# Patient Record
Sex: FEMALE | Race: WHITE
[De-identification: names, ages, dates, MRNs, and addresses within clinical notes are randomized per-mention and may not be internally consistent; named-entity substitution may affect disease eponyms.]

---

## 2018-05-28 ENCOUNTER — HOSPITAL ENCOUNTER (EMERGENCY)
Dept: HOSPITAL 61 - ER | Age: 58
Discharge: HOME | End: 2018-05-28
Payer: COMMERCIAL

## 2018-05-28 DIAGNOSIS — L23.9: Primary | ICD-10-CM

## 2018-05-28 DIAGNOSIS — E03.9: ICD-10-CM

## 2018-05-28 DIAGNOSIS — Z88.0: ICD-10-CM

## 2018-05-28 DIAGNOSIS — I10: ICD-10-CM

## 2018-05-28 DIAGNOSIS — Z88.1: ICD-10-CM

## 2018-05-28 PROCEDURE — 96372 THER/PROPH/DIAG INJ SC/IM: CPT

## 2018-05-28 PROCEDURE — 99283 EMERGENCY DEPT VISIT LOW MDM: CPT

## 2018-05-28 RX ADMIN — DEXAMETHASONE SODIUM PHOSPHATE 1 MG: 4 INJECTION, SOLUTION INTRAMUSCULAR; INTRAVENOUS at 09:56

## 2018-07-10 ENCOUNTER — HOSPITAL ENCOUNTER (EMERGENCY)
Dept: HOSPITAL 61 - ER | Age: 58
Discharge: HOME | End: 2018-07-10
Payer: COMMERCIAL

## 2018-07-10 DIAGNOSIS — Z88.1: ICD-10-CM

## 2018-07-10 DIAGNOSIS — F10.10: ICD-10-CM

## 2018-07-10 DIAGNOSIS — F41.9: ICD-10-CM

## 2018-07-10 DIAGNOSIS — E03.9: ICD-10-CM

## 2018-07-10 DIAGNOSIS — E86.0: ICD-10-CM

## 2018-07-10 DIAGNOSIS — Z88.0: ICD-10-CM

## 2018-07-10 DIAGNOSIS — I10: ICD-10-CM

## 2018-07-10 DIAGNOSIS — I47.1: Primary | ICD-10-CM

## 2018-07-10 DIAGNOSIS — Z90.710: ICD-10-CM

## 2018-07-10 LAB
ADD MAN DIFF?: NO
ALBUMIN SERPL-MCNC: 3.9 G/DL (ref 3.4–5)
ALBUMIN/GLOB SERPL: 1 {RATIO} (ref 1–1.7)
ALP SERPL-CCNC: 62 U/L (ref 46–116)
ALT (SGPT): 37 U/L (ref 14–59)
ANION GAP SERPL CALC-SCNC: 11 MMOL/L (ref 6–14)
AST SERPL-CCNC: 51 U/L (ref 15–37)
BASO #: 0 X10^3/UL (ref 0–0.2)
BASO %: 1 % (ref 0–3)
BLOOD UREA NITROGEN: 22 MG/DL (ref 7–20)
BUN/CREAT SERPL: 18 (ref 6–20)
CALCIUM: 9.6 MG/DL (ref 8.5–10.1)
CHLORIDE: 102 MMOL/L (ref 98–107)
CHOLESTEROL/HDL RATIO: 1.9
CHOLESTEROL: 278 MG/DL (ref 0–200)
CK SERPL-CCNC: 59 U/L (ref 26–192)
CKMB INDEX: (no result) % (ref 0–4)
CKMB MASS: 0.7 NG/ML (ref 0–3.6)
CO2 SERPL-SCNC: 24 MMOL/L (ref 21–32)
CREAT SERPL-MCNC: 1.2 MG/DL (ref 0.6–1)
EOS #: 0.1 X10^3/UL (ref 0–0.7)
EOS %: 2 % (ref 0–3)
GFR SERPLBLD BASED ON 1.73 SQ M-ARVRAT: 45.8 ML/MIN
GLOBULIN SER-MCNC: 3.8 G/DL (ref 2.2–3.8)
GLUCOSE SERPL-MCNC: 105 MG/DL (ref 70–99)
HCG SERPL-ACNC: 4 X10^3/UL (ref 4–11)
HDLC: 146 MG/DL (ref 40–60)
HEMATOCRIT: 37.2 % (ref 36–47)
HEMOGLOBIN: 12.9 G/DL (ref 12–15.5)
INR: 0.9 (ref 0.8–1.1)
LDLC: 119 MG/DL (ref 0–100)
LYMPH #: 1 X10^3/UL (ref 1–4.8)
LYMPH %: 24 % (ref 24–48)
MAGNESIUM: 1.8 MG/DL (ref 1.8–2.4)
MEAN CORPUSCULAR HEMOGLOBIN: 33 PG (ref 25–35)
MEAN CORPUSCULAR HGB CONC: 35 G/DL (ref 31–37)
MEAN CORPUSCULAR VOLUME: 96 FL (ref 79–100)
MONO #: 0.3 X10^3/UL (ref 0–1.1)
MONO %: 7 % (ref 0–9)
NEUT #: 2.6 X10^3UL (ref 1.8–7.7)
NEUT %: 67 % (ref 31–73)
NON-HDL CHOLESTEROL: 132 MG/DL (ref 0–129)
NT-PRO BNP: 31 PG/ML (ref 0–124)
PLATELET COUNT: 219 X10^3/UL (ref 140–400)
POTASSIUM SERPL-SCNC: 4.6 MMOL/L (ref 3.5–5.1)
PROTHROMBIN TIME PATIENT: 11.7 SEC (ref 11.7–14)
RED BLOOD COUNT: 3.89 X10^6/UL (ref 3.5–5.4)
RED CELL DISTRIBUTION WIDTH: 13.7 % (ref 11.5–14.5)
SODIUM: 137 MMOL/L (ref 136–145)
THYROID STIM HORMONE (TSH): 3.67 UIU/ML (ref 0.36–3.74)
TOTAL BILIRUBIN: 0.5 MG/DL (ref 0.2–1)
TOTAL PROTEIN: 7.7 G/DL (ref 6.4–8.2)
TRIGLYCERIDES: 63 MG/DL (ref 0–150)
VLDLC: 13 MG/DL (ref 0–40)

## 2018-07-10 PROCEDURE — 83735 ASSAY OF MAGNESIUM: CPT

## 2018-07-10 PROCEDURE — 83880 ASSAY OF NATRIURETIC PEPTIDE: CPT

## 2018-07-10 PROCEDURE — 85025 COMPLETE CBC W/AUTO DIFF WBC: CPT

## 2018-07-10 PROCEDURE — 80053 COMPREHEN METABOLIC PANEL: CPT

## 2018-07-10 PROCEDURE — 99285 EMERGENCY DEPT VISIT HI MDM: CPT

## 2018-07-10 PROCEDURE — 93005 ELECTROCARDIOGRAM TRACING: CPT

## 2018-07-10 PROCEDURE — 80061 LIPID PANEL: CPT

## 2018-07-10 PROCEDURE — 85610 PROTHROMBIN TIME: CPT

## 2018-07-10 PROCEDURE — 82553 CREATINE MB FRACTION: CPT

## 2018-07-10 PROCEDURE — 36415 COLL VENOUS BLD VENIPUNCTURE: CPT

## 2018-07-10 PROCEDURE — 71045 X-RAY EXAM CHEST 1 VIEW: CPT

## 2018-07-10 PROCEDURE — 84443 ASSAY THYROID STIM HORMONE: CPT

## 2021-08-07 ENCOUNTER — HOSPITAL ENCOUNTER (EMERGENCY)
Dept: HOSPITAL 61 - ER | Age: 61
Discharge: HOME | End: 2021-08-07
Payer: COMMERCIAL

## 2021-08-07 VITALS — DIASTOLIC BLOOD PRESSURE: 55 MMHG | SYSTOLIC BLOOD PRESSURE: 98 MMHG

## 2021-08-07 VITALS — BODY MASS INDEX: 28.41 KG/M2 | HEIGHT: 70 IN | WEIGHT: 198.42 LBS

## 2021-08-07 DIAGNOSIS — Z88.0: ICD-10-CM

## 2021-08-07 DIAGNOSIS — Z90.710: ICD-10-CM
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
## 2021-08-07 DIAGNOSIS — Z88.1: ICD-10-CM

## 2021-08-07 DIAGNOSIS — E86.0: ICD-10-CM

## 2021-08-07 DIAGNOSIS — F10.20: ICD-10-CM

## 2021-08-07 DIAGNOSIS — E03.9: ICD-10-CM

## 2021-08-07 DIAGNOSIS — I10: ICD-10-CM

## 2021-08-07 DIAGNOSIS — R55: Primary | ICD-10-CM

## 2021-08-07 LAB
ALBUMIN SERPL-MCNC: 3.6 G/DL (ref 3.4–5)
ALBUMIN/GLOB SERPL: 1.2 {RATIO} (ref 1–1.7)
ALP SERPL-CCNC: 58 U/L (ref 46–116)
ALT SERPL-CCNC: 37 U/L (ref 14–59)
ANION GAP SERPL CALC-SCNC: 9 MMOL/L (ref 6–14)
AST SERPL-CCNC: 31 U/L (ref 15–37)
BASOPHILS # BLD AUTO: 0.1 X10^3/UL (ref 0–0.2)
BASOPHILS NFR BLD: 1 % (ref 0–3)
BILIRUB SERPL-MCNC: 0.2 MG/DL (ref 0.2–1)
BUN SERPL-MCNC: 29 MG/DL (ref 7–20)
BUN/CREAT SERPL: 26 (ref 6–20)
CALCIUM SERPL-MCNC: 9.3 MG/DL (ref 8.5–10.1)
CHLORIDE SERPL-SCNC: 99 MMOL/L (ref 98–107)
CO2 SERPL-SCNC: 26 MMOL/L (ref 21–32)
CREAT SERPL-MCNC: 1.1 MG/DL (ref 0.6–1)
EOSINOPHIL NFR BLD: 0.1 X10^3/UL (ref 0–0.7)
EOSINOPHIL NFR BLD: 1 % (ref 0–3)
ERYTHROCYTE [DISTWIDTH] IN BLOOD BY AUTOMATED COUNT: 13.1 % (ref 11.5–14.5)
GFR SERPLBLD BASED ON 1.73 SQ M-ARVRAT: 50.2 ML/MIN
GLUCOSE SERPL-MCNC: 110 MG/DL (ref 70–99)
HCT VFR BLD CALC: 32.1 % (ref 36–47)
HGB BLD-MCNC: 11.2 G/DL (ref 12–15.5)
LYMPHOCYTES # BLD: 1.4 X10^3/UL (ref 1–4.8)
LYMPHOCYTES NFR BLD AUTO: 19 % (ref 24–48)
MAGNESIUM SERPL-MCNC: 2 MG/DL (ref 1.8–2.4)
MCH RBC QN AUTO: 33 PG (ref 25–35)
MCHC RBC AUTO-ENTMCNC: 35 G/DL (ref 31–37)
MCV RBC AUTO: 95 FL (ref 79–100)
MONO #: 0.6 X10^3/UL (ref 0–1.1)
MONOCYTES NFR BLD: 7 % (ref 0–9)
NEUT #: 5.5 X10^3/UL (ref 1.8–7.7)
NEUTROPHILS NFR BLD AUTO: 72 % (ref 31–73)
PLATELET # BLD AUTO: 180 X10^3/UL (ref 140–400)
POTASSIUM SERPL-SCNC: 4.9 MMOL/L (ref 3.5–5.1)
PROT SERPL-MCNC: 6.5 G/DL (ref 6.4–8.2)
RBC # BLD AUTO: 3.36 X10^6/UL (ref 3.5–5.4)
SODIUM SERPL-SCNC: 134 MMOL/L (ref 136–145)
WBC # BLD AUTO: 7.6 X10^3/UL (ref 4–11)

## 2021-08-07 PROCEDURE — 93005 ELECTROCARDIOGRAM TRACING: CPT

## 2021-08-07 PROCEDURE — 96361 HYDRATE IV INFUSION ADD-ON: CPT

## 2021-08-07 PROCEDURE — 85025 COMPLETE CBC W/AUTO DIFF WBC: CPT

## 2021-08-07 PROCEDURE — 83735 ASSAY OF MAGNESIUM: CPT

## 2021-08-07 PROCEDURE — 99283 EMERGENCY DEPT VISIT LOW MDM: CPT

## 2021-08-07 PROCEDURE — 84484 ASSAY OF TROPONIN QUANT: CPT

## 2021-08-07 PROCEDURE — 36415 COLL VENOUS BLD VENIPUNCTURE: CPT

## 2021-08-07 PROCEDURE — 80053 COMPREHEN METABOLIC PANEL: CPT

## 2021-08-07 PROCEDURE — 96360 HYDRATION IV INFUSION INIT: CPT

## 2021-08-07 PROCEDURE — 83880 ASSAY OF NATRIURETIC PEPTIDE: CPT

## 2021-08-07 NOTE — PHYS DOC
Past Medical History


Past Medical History:  Anxiety, Hypertension, Hypothyroid


Additional Past Medical Histor:  SVT


Past Surgical History:  Hysterectomy, Other


Additional Past Surgical Histo:  Node biopsy on neck, cardiac ablation


Smoking Status:  Never Smoker


Alcohol Use:  Heavy


Drug Use:  None





General Adult


EDM:


Chief Complaint:  NEAR SYNCOPE





HPI:


HPI:





Patient is a 61  year old female who was brought here by EMS due to near 

syncopal episode.  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 

was walking outside under the heat, she started sweating feeling dizziness, feel

like she might pass out.  Patient sat down in the chair, recheck her blood 

pressure was low, EMS was called, they check her blood pressure was 73/40, they 

gave her some normal saline.  Patient did not eat breakfast this morning, they 

gave her a donut to eat.  Patient has a history of hypertension, she is on 

lisinopril 20 mg daily.  Patient did take her blood pressure medications the 

morning.  Patient denies any cough or fever, no chest pain, no abdominal pain, 

no headache.  Patient denies any nausea vomiting.  Patient was vaccinated for 

COVID-19 in April.  Patient states She is feeling much better now.





Review of Systems:


Review of Systems:


Constitutional:   Denies fever or chills. []


Eyes:   Denies change in visual acuity. []


HENT:   Denies nasal congestion or sore throat. [] 


Respiratory:   Denies cough or shortness of breath. [] 


Cardiovascular:   Denies chest pain or edema. [] 


GI:   Denies abdominal pain, nausea, vomiting, bloody stools or diarrhea. [] 


:  Denies dysuria. [] 


Musculoskeletal:   Denies back pain or joint pain. [] 


Integument:   Denies rash. [] 


Neurologic:   Denies headache, focal weakness or sensory changes.  Positive for 

near syncope.


Endocrine:   Denies polyuria or polydipsia. [] 


Lymphatic:  Denies swollen glands. [] 


Psychiatric:  Denies depression or anxiety. []





Heart Score:


C/O Chest Pain:  N/A


Risk Factors:


Risk Factors:  DM, Current or recent (<one month) smoker, HTN, HLP, family 

history of CAD, obesity.


Risk Scores:


Score 0 - 3:  2.5% MACE over next 6 weeks - Discharge Home


Score 4 - 6:  20.3% MACE over next 6 weeks - Admit for Clinical Observation


Score 7 - 10:  72.7% MACE over next 6 weeks - Early Invasive Strategies





Current Medications:





Current Medications








 Medications


  (Trade)  Dose


 Ordered  Sig/Linda  Start Time


 Stop Time Status Last Admin


Dose Admin


 


 Sodium Chloride  1,000 ml @ 


 1,000 mls/hr  1X  ONCE  8/7/21 09:00


 8/7/21 09:59   














Allergies:


Allergies:





Allergies








Coded Allergies Type Severity Reaction Last Updated Verified


 


  Penicillins Allergy Intermediate swelling 5/28/18 Yes


 


  amoxicillin Allergy Intermediate swelling 5/28/18 Yes











Physical Exam:


PE:





Constitutional: Well developed, well nourished, no acute distress, non-toxic 

appearance. []


HENT: Normocephalic, atraumatic, bilateral external ears normal, oropharynx 

moist, no oral exudates, nose normal. []


Eyes: PERRLA, EOMI, conjunctiva normal, no discharge. [] 


Neck: Normal range of motion, no tenderness, supple, no stridor. [] 


Cardiovascular:Heart rate regular rhythm, no murmur []


Lungs & Thorax:  Bilateral breath sounds clear to auscultation []


Abdomen: Bowel sounds normal, soft, no tenderness, no masses, no pulsatile 

masses. [] 


Skin: Warm, dry, no erythema, no rash. [] 


Back: No tenderness, no CVA tenderness. [] 


Extremities: No tenderness, no cyanosis, no clubbing, ROM intact, no edema. [] 


Neurologic: Alert and oriented X 3, normal motor function, normal sensory 

function, no focal deficits noted. []


Psychologic: Affect normal, judgement normal, mood normal. []





Current Patient Data:


Labs:





Laboratory Tests








Test


 8/7/21


08:36


 


White Blood Count 7.6 x10^3/uL 


 


Red Blood Count 3.36 x10^6/uL 


 


Hemoglobin 11.2 g/dL 


 


Hematocrit 32.1 % 


 


Mean Corpuscular Volume 95 fL 


 


Mean Corpuscular Hemoglobin 33 pg 


 


Mean Corpuscular Hemoglobin


Concent 35 g/dL 





 


Red Cell Distribution Width 13.1 % 


 


Platelet Count 180 x10^3/uL 


 


Neutrophils (%) (Auto) 72 % 


 


Lymphocytes (%) (Auto) 19 % 


 


Monocytes (%) (Auto) 7 % 


 


Eosinophils (%) (Auto) 1 % 


 


Basophils (%) (Auto) 1 % 


 


Neutrophils # (Auto) 5.5 x10^3/uL 


 


Lymphocytes # (Auto) 1.4 x10^3/uL 


 


Monocytes # (Auto) 0.6 x10^3/uL 


 


Eosinophils # (Auto) 0.1 x10^3/uL 


 


Basophils # (Auto) 0.1 x10^3/uL 


 


Sodium Level 134 mmol/L 


 


Potassium Level 4.9 mmol/L 


 


Chloride Level 99 mmol/L 


 


Carbon Dioxide Level 26 mmol/L 


 


Anion Gap 9 


 


Blood Urea Nitrogen 29 mg/dL 


 


Creatinine 1.1 mg/dL 


 


Estimated GFR


(Cockcroft-Gault) 50.2 





 


BUN/Creatinine Ratio 26 


 


Glucose Level 110 mg/dL 


 


Calcium Level 9.3 mg/dL 


 


Magnesium Level 2.0 mg/dL 


 


Total Bilirubin 0.2 mg/dL 


 


Aspartate Amino Transf


(AST/SGOT) 31 U/L 





 


Alanine Aminotransferase


(ALT/SGPT) 37 U/L 





 


Alkaline Phosphatase 58 U/L 


 


Troponin I Quantitative < 0.017 ng/mL 


 


NT-Pro-B-Type Natriuretic


Peptide 29 pg/mL 





 


Total Protein 6.5 g/dL 


 


Albumin 3.6 g/dL 


 


Albumin/Globulin Ratio 1.2 








Current Medications








 Medications


  (Trade)  Dose


 Ordered  Sig/Linda


 Route


 PRN Reason  Start Time


 Stop Time Status Last Admin


Dose Admin


 


 Sodium Chloride  1,000 ml @ 


 1,000 mls/hr  1X  ONCE


 IV


   8/7/21 09:00


 8/7/21 09:59 DC 8/7/21 09:13











Vital Signs:





                                   Vital Signs








  Date Time  Temp Pulse Resp B/P (MAP) Pulse Ox O2 Delivery O2 Flow Rate FiO2


 


8/7/21 08:20 98.8 87 16 100/59 (80) 98 Room Air  





 98.8       











EKG:


EKG:


EKG was done at 829, heart rate of 75 bpm, normal sinus rhythm, no ST segment 

elevation.





Radiology/Procedures:


Radiology/Procedures:


[]





Course & Med Decision Making:


Course & Med Decision Making


Pertinent Labs and Imaging studies reviewed. (See chart for details)





Patient is a 60-year-old female who present to ER for evaluation of near 

syncopal episode.  Patient blood pressure was low, patient was given IV fluid in

 the ER, she feel much better.  Lab work did not show any acute problem.  

Patient will be discharged home.  Her blood pressure was reduced.  Patient will 

need to follow-up with her family physician for reevaluation in a couple days.  

Patient was amenable to plan of care.





Dragon Disclaimer:


Dragon Disclaimer:


This electronic medical record was generated, in whole or in part, using a voice

 recognition dictation system.





Departure


Departure


Impression:  


   Primary Impression:  


   Syncope, near


   Additional Impression:  


   Dehydration


Disposition:  01 HOME / SELF CARE / HOMELESS


Condition:  STABLE


Referrals:  


LEONORA HILTON MD (PCP)


Follow up with your doctor in 2 days for reevaluation.  Reduce  your blood 

pressure medication in half starting today.


Patient Instructions:  Dehydration, Adult, Near-Syncope





Additional Instructions:  


Thank you for visiting our Emergency Department. We appreciate you trusting us 

with your care. If any additional problems come up don't hesitate to return to 

visit us. Please follow up with your primary care provider so they can plan 

additional care if needed and know about the problem that you had. If symptoms 

worsen come back to the Emergency Department. Any concerning symptoms that start

 such as chest pain, shortness of air, weakness or numbness on one side of the 

body, running high fevers or any other concerning symptoms return to the ER.











LEONARD SCHUSTER DO                 Aug 7, 2021 09:09

## 2021-08-08 NOTE — EKG
[facility]

              [address] [place] [address]

Test Date:    2021               Test Time:    08:29:45

Pat Name:     ALEXANDRIA GALAN            Department:   

Patient ID:   [id number]           Room:          

Gender:       F                        Technician:   

:          [id number]               Requested By: LEONARD SCHUSTER

Order Number: [id number]           Reading MD:     

                                 Measurements

Intervals                              Axis          

Rate:         75                       P:            90

MS:           154                      QRS:          54

QRSD:         84                       T:            30

QT:           348                                    

QTc:          391                                    

                           Interpretive Statements

SINUS RHYTHM

NORMAL ECG

RI6.02

No previous ECG available for comparison